# Patient Record
Sex: MALE | Race: WHITE | HISPANIC OR LATINO | URBAN - METROPOLITAN AREA
[De-identification: names, ages, dates, MRNs, and addresses within clinical notes are randomized per-mention and may not be internally consistent; named-entity substitution may affect disease eponyms.]

---

## 2018-02-22 ENCOUNTER — INPATIENT (INPATIENT)
Facility: HOSPITAL | Age: 38
LOS: 1 days | Discharge: HOME | End: 2018-02-24
Attending: SURGERY

## 2018-02-22 VITALS
HEIGHT: 64 IN | TEMPERATURE: 96 F | OXYGEN SATURATION: 98 % | RESPIRATION RATE: 20 BRPM | SYSTOLIC BLOOD PRESSURE: 150 MMHG | DIASTOLIC BLOOD PRESSURE: 96 MMHG | HEART RATE: 71 BPM | WEIGHT: 143.96 LBS

## 2018-02-23 DIAGNOSIS — N28.9 DISORDER OF KIDNEY AND URETER, UNSPECIFIED: ICD-10-CM

## 2018-02-23 DIAGNOSIS — R10.9 UNSPECIFIED ABDOMINAL PAIN: ICD-10-CM

## 2018-02-23 DIAGNOSIS — Z00.00 ENCOUNTER FOR GENERAL ADULT MEDICAL EXAMINATION WITHOUT ABNORMAL FINDINGS: ICD-10-CM

## 2018-02-23 LAB
ALBUMIN SERPL ELPH-MCNC: 4.5 G/DL — SIGNIFICANT CHANGE UP (ref 3–5.5)
ALP SERPL-CCNC: 85 U/L — SIGNIFICANT CHANGE UP (ref 30–115)
ALT FLD-CCNC: 20 U/L — SIGNIFICANT CHANGE UP (ref 0–41)
ANION GAP SERPL CALC-SCNC: 11 MMOL/L — SIGNIFICANT CHANGE UP (ref 7–14)
APTT BLD: 33.5 SEC — SIGNIFICANT CHANGE UP (ref 27–39.2)
AST SERPL-CCNC: 21 U/L — SIGNIFICANT CHANGE UP (ref 0–41)
BASOPHILS # BLD AUTO: 0.03 K/UL — SIGNIFICANT CHANGE UP (ref 0–0.2)
BASOPHILS NFR BLD AUTO: 0.2 % — SIGNIFICANT CHANGE UP (ref 0–1)
BILIRUB SERPL-MCNC: 0.5 MG/DL — SIGNIFICANT CHANGE UP (ref 0.2–1.2)
BLD GP AB SCN SERPL QL: SIGNIFICANT CHANGE UP
BUN SERPL-MCNC: 14 MG/DL — SIGNIFICANT CHANGE UP (ref 10–20)
CALCIUM SERPL-MCNC: 9.3 MG/DL — SIGNIFICANT CHANGE UP (ref 8.5–10.1)
CHLORIDE SERPL-SCNC: 96 MMOL/L — LOW (ref 98–110)
CO2 SERPL-SCNC: 27 MMOL/L — SIGNIFICANT CHANGE UP (ref 17–32)
CREAT SERPL-MCNC: 0.7 MG/DL — SIGNIFICANT CHANGE UP (ref 0.7–1.5)
EOSINOPHIL # BLD AUTO: 0.01 K/UL — SIGNIFICANT CHANGE UP (ref 0–0.7)
EOSINOPHIL NFR BLD AUTO: 0.1 % — SIGNIFICANT CHANGE UP (ref 0–8)
GLUCOSE SERPL-MCNC: 119 MG/DL — HIGH (ref 70–110)
HCT VFR BLD CALC: 44.7 % — SIGNIFICANT CHANGE UP (ref 42–52)
HGB BLD-MCNC: 15.3 G/DL — SIGNIFICANT CHANGE UP (ref 14–18)
IMM GRANULOCYTES NFR BLD AUTO: 1 % — HIGH (ref 0.1–0.3)
INR BLD: 1.07 RATIO — SIGNIFICANT CHANGE UP (ref 0.65–1.3)
LACTATE SERPL-SCNC: 2.1 MMOL/L — SIGNIFICANT CHANGE UP (ref 0.5–2.2)
LIDOCAIN IGE QN: 29 U/L — SIGNIFICANT CHANGE UP (ref 7–60)
LYMPHOCYTES # BLD AUTO: 0.91 K/UL — LOW (ref 1.2–3.4)
LYMPHOCYTES # BLD AUTO: 6.6 % — LOW (ref 20.5–51.1)
MCHC RBC-ENTMCNC: 30.5 PG — SIGNIFICANT CHANGE UP (ref 27–31)
MCHC RBC-ENTMCNC: 34.2 G/DL — SIGNIFICANT CHANGE UP (ref 32–37)
MCV RBC AUTO: 89 FL — SIGNIFICANT CHANGE UP (ref 80–94)
MONOCYTES # BLD AUTO: 0.36 K/UL — SIGNIFICANT CHANGE UP (ref 0.1–0.6)
MONOCYTES NFR BLD AUTO: 2.6 % — SIGNIFICANT CHANGE UP (ref 1.7–9.3)
NEUTROPHILS # BLD AUTO: 12.32 K/UL — HIGH (ref 1.4–6.5)
NEUTROPHILS NFR BLD AUTO: 89.5 % — HIGH (ref 42.2–75.2)
NRBC # BLD: 0 /100 WBCS — SIGNIFICANT CHANGE UP (ref 0–0)
PLATELET # BLD AUTO: 271 K/UL — SIGNIFICANT CHANGE UP (ref 130–400)
POTASSIUM SERPL-MCNC: 3.2 MMOL/L — LOW (ref 3.5–5)
POTASSIUM SERPL-SCNC: 3.2 MMOL/L — LOW (ref 3.5–5)
PROT SERPL-MCNC: 7.7 G/DL — SIGNIFICANT CHANGE UP (ref 6–8)
PROTHROM AB SERPL-ACNC: 11.6 SEC — SIGNIFICANT CHANGE UP (ref 9.95–12.87)
RBC # BLD: 5.02 M/UL — SIGNIFICANT CHANGE UP (ref 4.7–6.1)
RBC # FLD: 12.4 % — SIGNIFICANT CHANGE UP (ref 11.5–14.5)
SODIUM SERPL-SCNC: 134 MMOL/L — LOW (ref 135–146)
TYPE + AB SCN PNL BLD: SIGNIFICANT CHANGE UP
WBC # BLD: 13.77 K/UL — HIGH (ref 4.8–10.8)
WBC # FLD AUTO: 13.77 K/UL — HIGH (ref 4.8–10.8)

## 2018-02-23 RX ORDER — CEFTRIAXONE 500 MG/1
1 INJECTION, POWDER, FOR SOLUTION INTRAMUSCULAR; INTRAVENOUS EVERY 24 HOURS
Qty: 0 | Refills: 0 | Status: CANCELLED | OUTPATIENT
Start: 2018-02-24 | End: 2018-02-23

## 2018-02-23 RX ORDER — OXYCODONE AND ACETAMINOPHEN 5; 325 MG/1; MG/1
2 TABLET ORAL ONCE
Qty: 0 | Refills: 0 | Status: DISCONTINUED | OUTPATIENT
Start: 2018-02-23 | End: 2018-02-24

## 2018-02-23 RX ORDER — FAMOTIDINE 10 MG/ML
20 INJECTION INTRAVENOUS ONCE
Qty: 0 | Refills: 0 | Status: COMPLETED | OUTPATIENT
Start: 2018-02-23 | End: 2018-02-23

## 2018-02-23 RX ORDER — MORPHINE SULFATE 50 MG/1
6 CAPSULE, EXTENDED RELEASE ORAL ONCE
Qty: 0 | Refills: 0 | Status: DISCONTINUED | OUTPATIENT
Start: 2018-02-23 | End: 2018-02-23

## 2018-02-23 RX ORDER — MEPERIDINE HYDROCHLORIDE 50 MG/ML
12.5 INJECTION INTRAMUSCULAR; INTRAVENOUS; SUBCUTANEOUS ONCE
Qty: 0 | Refills: 0 | Status: DISCONTINUED | OUTPATIENT
Start: 2018-02-23 | End: 2018-02-24

## 2018-02-23 RX ORDER — HEPARIN SODIUM 5000 [USP'U]/ML
5000 INJECTION INTRAVENOUS; SUBCUTANEOUS EVERY 8 HOURS
Qty: 0 | Refills: 0 | Status: DISCONTINUED | OUTPATIENT
Start: 2018-02-23 | End: 2018-02-23

## 2018-02-23 RX ORDER — HYDROMORPHONE HYDROCHLORIDE 2 MG/ML
0.5 INJECTION INTRAMUSCULAR; INTRAVENOUS; SUBCUTANEOUS
Qty: 0 | Refills: 0 | Status: DISCONTINUED | OUTPATIENT
Start: 2018-02-23 | End: 2018-02-24

## 2018-02-23 RX ORDER — SODIUM CHLORIDE 9 MG/ML
1000 INJECTION INTRAMUSCULAR; INTRAVENOUS; SUBCUTANEOUS ONCE
Qty: 0 | Refills: 0 | Status: COMPLETED | OUTPATIENT
Start: 2018-02-23 | End: 2018-02-23

## 2018-02-23 RX ORDER — MORPHINE SULFATE 50 MG/1
2 CAPSULE, EXTENDED RELEASE ORAL EVERY 6 HOURS
Qty: 0 | Refills: 0 | Status: DISCONTINUED | OUTPATIENT
Start: 2018-02-23 | End: 2018-02-23

## 2018-02-23 RX ORDER — SODIUM CHLORIDE 9 MG/ML
1000 INJECTION, SOLUTION INTRAVENOUS
Qty: 0 | Refills: 0 | Status: DISCONTINUED | OUTPATIENT
Start: 2018-02-23 | End: 2018-02-23

## 2018-02-23 RX ORDER — MORPHINE SULFATE 50 MG/1
6 CAPSULE, EXTENDED RELEASE ORAL EVERY 4 HOURS
Qty: 0 | Refills: 0 | Status: CANCELLED | OUTPATIENT
Start: 2018-03-03 | End: 2018-02-23

## 2018-02-23 RX ORDER — METOCLOPRAMIDE HCL 10 MG
10 TABLET ORAL ONCE
Qty: 0 | Refills: 0 | Status: DISCONTINUED | OUTPATIENT
Start: 2018-02-23 | End: 2018-02-23

## 2018-02-23 RX ORDER — SODIUM CHLORIDE 9 MG/ML
1000 INJECTION, SOLUTION INTRAVENOUS
Qty: 0 | Refills: 0 | Status: DISCONTINUED | OUTPATIENT
Start: 2018-02-23 | End: 2018-02-24

## 2018-02-23 RX ORDER — MORPHINE SULFATE 50 MG/1
4 CAPSULE, EXTENDED RELEASE ORAL
Qty: 0 | Refills: 0 | Status: DISCONTINUED | OUTPATIENT
Start: 2018-02-23 | End: 2018-02-24

## 2018-02-23 RX ORDER — METRONIDAZOLE 500 MG
TABLET ORAL
Qty: 0 | Refills: 0 | Status: DISCONTINUED | OUTPATIENT
Start: 2018-02-23 | End: 2018-02-23

## 2018-02-23 RX ORDER — CEFTRIAXONE 500 MG/1
INJECTION, POWDER, FOR SOLUTION INTRAMUSCULAR; INTRAVENOUS
Qty: 0 | Refills: 0 | Status: DISCONTINUED | OUTPATIENT
Start: 2018-02-23 | End: 2018-02-23

## 2018-02-23 RX ORDER — METOCLOPRAMIDE HCL 10 MG
10 TABLET ORAL EVERY 6 HOURS
Qty: 0 | Refills: 0 | Status: DISCONTINUED | OUTPATIENT
Start: 2018-02-23 | End: 2018-02-23

## 2018-02-23 RX ORDER — CEFTRIAXONE 500 MG/1
1 INJECTION, POWDER, FOR SOLUTION INTRAMUSCULAR; INTRAVENOUS ONCE
Qty: 0 | Refills: 0 | Status: COMPLETED | OUTPATIENT
Start: 2018-02-23 | End: 2018-02-23

## 2018-02-23 RX ORDER — METRONIDAZOLE 500 MG
500 TABLET ORAL ONCE
Qty: 0 | Refills: 0 | Status: COMPLETED | OUTPATIENT
Start: 2018-02-23 | End: 2018-02-23

## 2018-02-23 RX ORDER — METRONIDAZOLE 500 MG
500 TABLET ORAL EVERY 8 HOURS
Qty: 0 | Refills: 0 | Status: CANCELLED | OUTPATIENT
Start: 2018-02-24 | End: 2018-02-23

## 2018-02-23 RX ADMIN — MORPHINE SULFATE 6 MILLIGRAM(S): 50 CAPSULE, EXTENDED RELEASE ORAL at 01:49

## 2018-02-23 RX ADMIN — MORPHINE SULFATE 6 MILLIGRAM(S): 50 CAPSULE, EXTENDED RELEASE ORAL at 02:13

## 2018-02-23 RX ADMIN — HYDROMORPHONE HYDROCHLORIDE 0.5 MILLIGRAM(S): 2 INJECTION INTRAMUSCULAR; INTRAVENOUS; SUBCUTANEOUS at 23:32

## 2018-02-23 RX ADMIN — SODIUM CHLORIDE 75 MILLILITER(S): 9 INJECTION, SOLUTION INTRAVENOUS at 17:43

## 2018-02-23 RX ADMIN — FAMOTIDINE 20 MILLIGRAM(S): 10 INJECTION INTRAVENOUS at 02:12

## 2018-02-23 RX ADMIN — Medication 100 MILLIGRAM(S): at 19:52

## 2018-02-23 RX ADMIN — HYDROMORPHONE HYDROCHLORIDE 0.5 MILLIGRAM(S): 2 INJECTION INTRAMUSCULAR; INTRAVENOUS; SUBCUTANEOUS at 23:14

## 2018-02-23 RX ADMIN — SODIUM CHLORIDE 1000 MILLILITER(S): 9 INJECTION INTRAMUSCULAR; INTRAVENOUS; SUBCUTANEOUS at 02:14

## 2018-02-23 RX ADMIN — SODIUM CHLORIDE 125 MILLILITER(S): 9 INJECTION, SOLUTION INTRAVENOUS at 23:00

## 2018-02-23 RX ADMIN — Medication 30 MILLILITER(S): at 02:21

## 2018-02-23 NOTE — H&P ADULT - NSHPPHYSICALEXAM_GEN_ALL_CORE
T(C): 36.7 (02-23-18 @ 08:51), Max: 36.7 (02-23-18 @ 08:51)  HR: 78 (02-23-18 @ 08:51) (71 - 78)  BP: 120/70 (02-23-18 @ 08:51) (120/70 - 150/96)  RR: 18 (02-23-18 @ 08:51) (18 - 20)  SpO2: 97% (02-23-18 @ 08:51) (97% - 98%)    PHYSICAL EXAM:    GENERAL: pain improved  HEAD:  Atraumatic, Normocephalic  EYES: EOMI, PERRLA, conjunctiva and sclera clear  NECK: Supple, No JVD  CHEST/LUNG: Clear to auscultation bilaterally; No wheeze  HEART: Regular rate and rhythm; No murmurs, rubs, or gallops  ABDOMEN: Soft, ttp in center, + sheikh,  Bowel sounds present  EXTREMITIES:  2+ Peripheral Pulses, No clubbing, cyanosis, or edema  PSYCH: AAOx3  NEUROLOGY: non-focal  SKIN: No rashes or lesions

## 2018-02-23 NOTE — H&P ADULT - HISTORY OF PRESENT ILLNESS
38 year old male with sudden onset central abdominal pain 1 hour after a meal of laxmi, chicken and rice yesterday at 5pm. Pain was localised with no radiation to the back or shoulder. He denied fever at home but did vomit 5 times. Vomitus was of food with no blood. he opened his bowels regularly prior and denied urinary symptoms. He denied recent weight loss or blood in the urine. He had no previous abdominal surgeries and described only symptoms of occasional reflux.  Otherwise he has no known history of gallstones.     In ED found to have gallbladder sludge, edema and cholelithiasis, he was seen by surgery who recommneded hida scan.    ED meds:  maalox/ famotidien/ ns 1l/ morphine 6*2/ reglan 38 year old male with no PMH came in for sudden onset central abdominal pain 1 hour after a meal of laxmi, chicken and rice yesterday at 5pm. Pain was localised with no radiation to the back or shoulder. He had chills at home AND vomitted 5 times. Vomitus was of food with no blood. No diarrhea or  urinary symptoms. He denied recent weight loss or blood in the urine. He had no previous abdominal surgeries and described only symptoms of occasional reflux.  Otherwise he has no known history of gallstones.   Doesn't have PMD.    In ED found to have gallbladder sludge, edema and cholelithiasis, he was seen by surgery who recommended hida scan.    ED meds:  maalox/ famotidien/ ns 1l/ morphine 6*2/ reglan

## 2018-02-23 NOTE — CHART NOTE - NSCHARTNOTEFT_GEN_A_CORE
Anesthesia Post Op Assessment  		(    ) Intubated           TV _____	Rate _SV____	FiO2__4LNC___  		( X   ) Patent airway. Full return of protective reflexes  		(  X  )Full recovery from anesthesia/sedation to baseline status      Cardiovascular Function:  		BP:	141/91	                  Pulse:		100                  RR:		12                  Temp:		97.6                  O2Sat:100      Mental Status:  	        (  X  ) awake		  (  X  ) alert		 (    ) drowsy	               (    ) sedated      Nausea/Vomiting:  		(    ) Yes, See post-op orders		   (  X ) No      Pain Scale: (0-10):			Treatment:     (  X  ) None	            (    ) See Post-Op/PCA Orders      Post-operative Fluids: 	   (    ) Oral	          ( X   ) See post-op Orders        Comments:    Uneventful. No complications from anesthesia.  Discharge when criteria met.

## 2018-02-23 NOTE — H&P ADULT - PROBLEM SELECTOR PLAN 1
-r/o cholecystitis with thickening and edema of gallbladder wall  -will start ceftriaxone 1g daily  -f/u blood cultures  -f/u with surgery -r/o cholecystitis with thickening and edema of gallbladder wall  -awaiting hida scan result  -will start ceftriaxone 1g daily  -f/u blood cultures  -f/u with surgery

## 2018-02-23 NOTE — ED PROVIDER NOTE - NS ED ROS FT
Constitutional:  no fevers, no chills, no malaise  Eyes:  No visual changes  ENMT: No neck pain or stiffness, no nasal congestion, no ear pain, no throat pain  Cardiac:  No chest pain  Respiratory:  No cough or sob  GI:  As per HPI  :  No dysuria, frequency or burning.  MS:  No back pain, no joint pain.  Neuro:  No headache, no dizziness, no change in mental status  Skin:  No skin rash  Except as documented in the HPI,  all other systems are negative

## 2018-02-23 NOTE — H&P ADULT - NSHPREVIEWOFSYSTEMS_GEN_ALL_CORE
REVIEW OF SYSTEMS:    CONSTITUTIONAL:   RESPIRATORY: No cough, wheezing, hemoptysis; No shortness of breath  CARDIOVASCULAR: No chest pain or palpitations  GASTROINTESTINAL: abdominal pain. vomiting  GENITOURINARY: No dysuria, frequency or hematuria  NEUROLOGICAL: No numbness or weakness REVIEW OF SYSTEMS:    CONSTITUTIONAL: pain  RESPIRATORY: No cough, wheezing, hemoptysis; No shortness of breath  CARDIOVASCULAR: No chest pain or palpitations  GASTROINTESTINAL: abdominal pain. vomiting  GENITOURINARY: No dysuria, frequency or hematuria  NEUROLOGICAL: No numbness or weakness

## 2018-02-23 NOTE — ED PROVIDER NOTE - PHYSICAL EXAMINATION
CONSTITUTIONAL: Well-developed; well-nourished; in no acute distress, nontoxic appearing but ashish uncomfortable due to pain  SKIN: skin exam is warm and dry,  HEAD: Normocephalic; atraumatic.  EYES: PERRL, 3 mm bilateral, no nystagmus, EOM intact; conjunctiva and sclera clear.  ENT: MMM, no nasal congestion  NECK: Supple; non tender.+ full passive ROM in all directions. No JVD  CARD: S1, S2 normal, no murmur  RESP: No wheezes, rales or rhonchi. Good air movement bilaterally  ABD: soft; non-distended; + ttp of the epigastrium w/o guarding or rebound  EXT: Normal ROM. No cyanosis or edema. Dp Pulses intact.   NEURO: awake, alert, following commands, oriented, grossly unremarkable. No Focal deficits. GCS 15.   PSYCH: Cooperative, appropriate.

## 2018-02-23 NOTE — PRE-ANESTHESIA EVALUATION ADULT - NSANTHOSAYNRD_GEN_A_CORE
not done/No. ERENDIRA screening performed.  STOP BANG Legend: 0-2 = LOW Risk; 3-4 = INTERMEDIATE Risk; 5-8 = HIGH Risk

## 2018-02-23 NOTE — ED PROVIDER NOTE - MEDICAL DECISION MAKING DETAILS
Chart finished.     Pt seen by surgery and requesting HIDA scan prior to considering OR intervention.

## 2018-02-23 NOTE — H&P ADULT - ASSESSMENT
38 yr M with no significant pmhx presents with complaints of acute onset epigastric and RUQ abd pain that started after he finished eating dinner. Associated with nausea and 4 episodes  of nbnb vomiting. No diarrhea, fever, chills. No coughing, CP, or SOB. Pt is non smoker, occasionally drinks etoh. 38 yr M with no significant pmhx presents with complaints of acute onset epigastric and RUQ abd pain that started after he finished eating dinner. Associated with nausea and 4 episodes  of nbnb vomiting &chills , w/ ct scan and us abdomen showing cholelithiasis and edema of gallbladder wall suspicious for cholecystitis.    ddx: colecystitis/ pancreatitis/ gastroeneteritis/ gastritis/ hepatitis

## 2018-02-23 NOTE — CHART NOTE - NSCHARTNOTEFT_GEN_A_CORE
Risks, benefits and possible complications including, but not limited to bleeding, infection, need for further OR procedures, need for blood transfusion, PE, DVT stroke, acute cardiac event CBD injury, hernia formation, death, discussed in details with a patient, and patient's family and they understand and agree with procedure which they signed on the consent form and they do not have any further questions.

## 2018-02-23 NOTE — H&P ADULT - NSHPLABSRESULTS_GEN_ALL_CORE
15.3   13.77 )-----------( 271      ( 23 Feb 2018 01:48 )             44.7       02-23    134<L>  |  96<L>  |  14  ----------------------------<  119<H>  3.2<L>   |  27  |  0.7    Ca    9.3      23 Feb 2018 01:48    TPro  7.7  /  Alb  4.5  /  TBili  0.5  /  DBili  x   /  AST  21  /  ALT  20  /  AlkPhos  85  02-23    PT/INR - ( 23 Feb 2018 01:48 )   PT: 11.60 sec;   INR: 1.07 ratio         PTT - ( 23 Feb 2018 01:48 )  PTT:33.5 sec    Lactate Trend  02-23 @ 01:48 Lactate:2.1     -CT Abdomen and Pelvis w/ IV Cont (02.23.18 @ 04:00) >  Dilated gallbladder containing sludge with gallbladder wall edema,   without significant pericholecystic fat inflammation. Further evaluation   with right upper quadrant ultrasound is recommended.  Left renal upper pole 1.9 cm solid lesion with enhancement. Findings are   suspicious for renal cell carcinoma. Further evaluation with MRI is   recommended and urology follow-up is recommended.       US Abdomen Limited (02.23.18 @ 06:59) >    Cholelithiasis with thickened and edematous gallbladder wall. Trace   pericholecystic fluid. Please see concurrent CT scan.

## 2018-02-23 NOTE — H&P ADULT - PROBLEM SELECTOR PLAN 2
left renal 1.9 cm mass: r/o seizure  -needs abdominal MRI left renal 1.9 cm mass: r/o seizure  -needs abdominal MRI after his abdominal pain is taken care of.  -has no contraindication for MRI left renal 1.9 cm mass: r/o malignancy  -needs abdominal MRI after his abdominal pain is taken care of.  -has no contraindication for MRI

## 2018-02-23 NOTE — CONSULT NOTE ADULT - ASSESSMENT
38 year old with >12 hours of central abdominal pain and vomiting. Clinical picture inconsistent with cholecystitis.     Discussed patient with Dr Fisher    Plan:  Keep patient NPO  IVF  Analgesia  HIDA scan, await results before definitive management 38 year old with >12 hours of central abdominal pain and vomiting. Clinical picture inconsistent with cholecystitis.     Discussed patient with Dr Fisher    Plan:  Keep patient NPO  IVF  Analgesia  IV abx  HIDA scan, await results before definitive management  If positive, for lap moshe, possible intraop cholangiogram

## 2018-02-23 NOTE — CONSULT NOTE ADULT - SUBJECTIVE AND OBJECTIVE BOX
VINITA PEREZ [id number]  38y Male    HPI:  38 year old male with sudden onset central abdominal pain 1 hour after a meal of laxmi, chicken and rice yesterday at 5pm. Pain was localised with no radiation to the back or shoulder. He denied fever at home but did vomit 5 times. Vomitus was of food with no blood. he opened his bowels regularly prior and denied urinary symptoms. He denied recent weight loss or blood in the urine. He had no previous abdominal surgeries and described only symptoms of occasional reflux.  Otherwise he has no known history of gallstones.     PAST MEDICAL & SURGICAL HISTORY:  No pertinent past medical history  No significant past surgical history        MEDICATIONS  (STANDING):    MEDICATIONS  (PRN):      Allergies    Allergy Status Unknown    Intolerances        REVIEW OF SYSTEMS    [ ] A ten-point review of systems was otherwise negative except as noted.  [ ] Due to altered mental status/intubation, subjective information were not able to be obtained from the patient. History was obtained, to the extent possible, from review of the chart and collateral sources of information.      Vital Signs Last 24 Hrs  T(C): 35.6 (23 Feb 2018 00:30), Max: 35.6 (22 Feb 2018 23:31)  T(F): 96.1 (23 Feb 2018 00:30), Max: 96.1 (22 Feb 2018 23:31)  HR: 73 (23 Feb 2018 00:30) (71 - 73)  BP: 121/56 (23 Feb 2018 00:30) (121/56 - 150/96)  BP(mean): --  RR: 20 (23 Feb 2018 00:30) (20 - 20)  SpO2: 98% (22 Feb 2018 23:31) (98% - 98%)    PHYSICAL EXAM:  GENERAL: NAD, well-appearing  CHEST/LUNG: Clear to auscultation bilaterally  HEART: Regular rate and rhythm  ABDOMEN: Soft. No RUQ tenderness or epigastric tenderness. Central abdominal tenderness superior to umbilicus. No guarding, rebound or peritonism. Normal bowel sounds.  EXTREMITIES:  No clubbing, cyanosis, or edema      LABS:  Labs:  CAPILLARY BLOOD GLUCOSE                              15.3   13.77 )-----------( 271      ( 23 Feb 2018 01:48 )             44.7       Auto Neutrophil %: 89.5 % (02-23-18 @ 01:48)  Auto Immature Granulocyte %: 1.0 % (02-23-18 @ 01:48)    02-23    134<L>  |  96<L>  |  14  ----------------------------<  119<H>  3.2<L>   |  27  |  0.7      Calcium, Total Serum: 9.3 mg/dL (02-23-18 @ 01:48)      LFTs:             7.7  | 0.5  | 21       ------------------[85      ( 23 Feb 2018 01:48 )  4.5  | x    | 20          Lipase:29     Amylase:x         Lactate, Blood: 2.1 mmol/L (02-23-18 @ 01:48)      Coags:     11.60  ----< 1.07    ( 23 Feb 2018 01:48 )     33.5                    RADIOLOGY & ADDITIONAL STUDIES:  US abdomen: stones, GB wall thickening, no pericholecystic fluid, Downey's negative  CTAP:     Dilated gallbladder containing sludge with gallbladder wall edema, without   significant pericholecystic fat inflammation. Further evaluation with right   upper quadrant ultrasound is recommended     Left renal upper pole 1.9 cm solid lesion with enhancement. Findings are   suspicious for renal cell carcinoma. Further evaluation with MRI is   recommended and urology follow-up is recommended.

## 2018-02-23 NOTE — ED PROVIDER NOTE - PROGRESS NOTE DETAILS
Pt with gallbladder sludge and edema on CT. WIll order RUQ US. Gave signout to Dr. Chaidez. Pt pending surgical consult and RUQ US. Spoke with surgery resident Dr. Bowers

## 2018-02-23 NOTE — BRIEF OPERATIVE NOTE - PROCEDURE
Laparoscopic cholecystectomy  02/23/2018    Active  ILL5 <<-----Click on this checkbox to enter Procedure

## 2018-02-23 NOTE — ED PROVIDER NOTE - OBJECTIVE STATEMENT
38 yr M with no significant pmhx presents with complaints of acute onset epigastric and RUQ abd pain that started after he finished eating dinner. Associated with nausea and 4 episodes  of nbnb vomiting. No diarrhea, fever, chills. No coughing, CP, or SOB. Pt is non smoker, occasionally drinks etoh.

## 2018-02-24 VITALS
TEMPERATURE: 97 F | RESPIRATION RATE: 20 BRPM | SYSTOLIC BLOOD PRESSURE: 110 MMHG | HEART RATE: 73 BPM | DIASTOLIC BLOOD PRESSURE: 57 MMHG

## 2018-02-24 LAB
ALBUMIN SERPL ELPH-MCNC: 3.4 G/DL — SIGNIFICANT CHANGE UP (ref 3–5.5)
ALP SERPL-CCNC: 90 U/L — SIGNIFICANT CHANGE UP (ref 30–115)
ALT FLD-CCNC: 215 U/L — HIGH (ref 0–41)
ANION GAP SERPL CALC-SCNC: 8 MMOL/L — SIGNIFICANT CHANGE UP (ref 7–14)
AST SERPL-CCNC: 144 U/L — HIGH (ref 0–41)
BASOPHILS # BLD AUTO: 0.01 K/UL — SIGNIFICANT CHANGE UP (ref 0–0.2)
BASOPHILS NFR BLD AUTO: 0.1 % — SIGNIFICANT CHANGE UP (ref 0–1)
BILIRUB SERPL-MCNC: 0.8 MG/DL — SIGNIFICANT CHANGE UP (ref 0.2–1.2)
BUN SERPL-MCNC: 13 MG/DL — SIGNIFICANT CHANGE UP (ref 10–20)
CALCIUM SERPL-MCNC: 8.3 MG/DL — LOW (ref 8.5–10.1)
CHLORIDE SERPL-SCNC: 101 MMOL/L — SIGNIFICANT CHANGE UP (ref 98–110)
CO2 SERPL-SCNC: 25 MMOL/L — SIGNIFICANT CHANGE UP (ref 17–32)
CREAT SERPL-MCNC: 0.8 MG/DL — SIGNIFICANT CHANGE UP (ref 0.7–1.5)
EOSINOPHIL # BLD AUTO: 0 K/UL — SIGNIFICANT CHANGE UP (ref 0–0.7)
EOSINOPHIL NFR BLD AUTO: 0 % — SIGNIFICANT CHANGE UP (ref 0–8)
GLUCOSE SERPL-MCNC: 107 MG/DL — SIGNIFICANT CHANGE UP (ref 70–110)
HCT VFR BLD CALC: 40.5 % — LOW (ref 42–52)
HGB BLD-MCNC: 13.7 G/DL — LOW (ref 14–18)
IMM GRANULOCYTES NFR BLD AUTO: 0.4 % — HIGH (ref 0.1–0.3)
LACTATE SERPL-SCNC: 1 MMOL/L — SIGNIFICANT CHANGE UP (ref 0.5–2.2)
LYMPHOCYTES # BLD AUTO: 1.03 K/UL — LOW (ref 1.2–3.4)
LYMPHOCYTES # BLD AUTO: 9.1 % — LOW (ref 20.5–51.1)
MAGNESIUM SERPL-MCNC: 2.4 MG/DL — SIGNIFICANT CHANGE UP (ref 1.8–2.4)
MCHC RBC-ENTMCNC: 30.6 PG — SIGNIFICANT CHANGE UP (ref 27–31)
MCHC RBC-ENTMCNC: 33.8 G/DL — SIGNIFICANT CHANGE UP (ref 32–37)
MCV RBC AUTO: 90.4 FL — SIGNIFICANT CHANGE UP (ref 80–94)
MONOCYTES # BLD AUTO: 0.52 K/UL — SIGNIFICANT CHANGE UP (ref 0.1–0.6)
MONOCYTES NFR BLD AUTO: 4.6 % — SIGNIFICANT CHANGE UP (ref 1.7–9.3)
NEUTROPHILS # BLD AUTO: 9.65 K/UL — HIGH (ref 1.4–6.5)
NEUTROPHILS NFR BLD AUTO: 85.8 % — HIGH (ref 42.2–75.2)
PLATELET # BLD AUTO: 224 K/UL — SIGNIFICANT CHANGE UP (ref 130–400)
POTASSIUM SERPL-MCNC: 4.2 MMOL/L — SIGNIFICANT CHANGE UP (ref 3.5–5)
POTASSIUM SERPL-SCNC: 4.2 MMOL/L — SIGNIFICANT CHANGE UP (ref 3.5–5)
PROT SERPL-MCNC: 5.9 G/DL — LOW (ref 6–8)
RBC # BLD: 4.48 M/UL — LOW (ref 4.7–6.1)
RBC # FLD: 12.7 % — SIGNIFICANT CHANGE UP (ref 11.5–14.5)
SODIUM SERPL-SCNC: 134 MMOL/L — LOW (ref 135–146)
WBC # BLD: 11.26 K/UL — HIGH (ref 4.8–10.8)
WBC # FLD AUTO: 11.26 K/UL — HIGH (ref 4.8–10.8)

## 2018-02-24 RX ORDER — INFLUENZA VIRUS VACCINE 15; 15; 15; 15 UG/.5ML; UG/.5ML; UG/.5ML; UG/.5ML
0.5 SUSPENSION INTRAMUSCULAR ONCE
Qty: 0 | Refills: 0 | Status: COMPLETED | OUTPATIENT
Start: 2018-02-24 | End: 2018-02-24

## 2018-02-24 RX ORDER — ACETAMINOPHEN WITH CODEINE 300MG-30MG
1 TABLET ORAL EVERY 4 HOURS
Qty: 0 | Refills: 0 | Status: DISCONTINUED | OUTPATIENT
Start: 2018-02-24 | End: 2018-02-24

## 2018-02-24 RX ORDER — OXYCODONE AND ACETAMINOPHEN 5; 325 MG/1; MG/1
2 TABLET ORAL EVERY 6 HOURS
Qty: 0 | Refills: 0 | Status: DISCONTINUED | OUTPATIENT
Start: 2018-02-24 | End: 2018-02-24

## 2018-02-24 RX ORDER — OXYCODONE AND ACETAMINOPHEN 5; 325 MG/1; MG/1
1 TABLET ORAL EVERY 4 HOURS
Qty: 0 | Refills: 0 | Status: DISCONTINUED | OUTPATIENT
Start: 2018-02-24 | End: 2018-02-24

## 2018-02-24 RX ORDER — HEPARIN SODIUM 5000 [USP'U]/ML
5000 INJECTION INTRAVENOUS; SUBCUTANEOUS EVERY 8 HOURS
Qty: 0 | Refills: 0 | Status: DISCONTINUED | OUTPATIENT
Start: 2018-02-24 | End: 2018-02-24

## 2018-02-24 RX ORDER — MORPHINE SULFATE 50 MG/1
2 CAPSULE, EXTENDED RELEASE ORAL EVERY 4 HOURS
Qty: 0 | Refills: 0 | Status: DISCONTINUED | OUTPATIENT
Start: 2018-02-24 | End: 2018-02-24

## 2018-02-24 RX ORDER — CEFOTETAN DISODIUM 1 G
1 VIAL (EA) INJECTION EVERY 12 HOURS
Qty: 0 | Refills: 0 | Status: DISCONTINUED | OUTPATIENT
Start: 2018-02-24 | End: 2018-02-24

## 2018-02-24 NOTE — DISCHARGE NOTE ADULT - MEDICATION SUMMARY - MEDICATIONS TO TAKE
I will START or STAY ON the medications listed below when I get home from the hospital:    Percocet 5/325 oral tablet  -- 1 tab(s) by mouth 4 times a day -for severe pain MDD:5 tabs   -- Caution federal law prohibits the transfer of this drug to any person other  than the person for whom it was prescribed.  May cause drowsiness.  Alcohol may intensify this effect.  Use care when operating dangerous machinery.  This prescription cannot be refilled.  This product contains acetaminophen.  Do not use  with any other product containing acetaminophen to prevent possible liver damage.  Using more of this medication than prescribed may cause serious breathing problems.    -- Indication: For postop

## 2018-02-24 NOTE — DISCHARGE NOTE ADULT - CARE PLAN
Principal Discharge DX:	Cholecystitis  Goal:	complete resolution  Assessment and plan of treatment:	s/p lap cholecystectomy  Goal:	complete resolution

## 2018-02-24 NOTE — PROGRESS NOTE ADULT - SUBJECTIVE AND OBJECTIVE BOX
SURGERY PROGRESS NOTE    Hospital Day #1d  Post-Op Day #1    Procedure/Dx: laparoscopic cholecystectomy     Events of past 24 hours:    Pt c/o RUQ abd pain s/p laparoscopic cholecystectomy.       Diet: reg    I&O's Detail    23 Feb 2018 07:01  -  24 Feb 2018 02:12  --------------------------------------------------------  IN:    lactated ringers.: 250 mL  Total IN: 250 mL    OUT:  Total OUT: 0 mL    Total NET: 250 mL      PHYSICAL EXAM    Vital Signs Last 24 Hrs  T(C): 37 (24 Feb 2018 00:00), Max: 37 (24 Feb 2018 00:00)  T(F): 98.6 (24 Feb 2018 00:00), Max: 98.6 (24 Feb 2018 00:00)  HR: 98 (24 Feb 2018 00:30) (78 - 118)  BP: 111/66 (24 Feb 2018 00:30) (111/66 - 150/93)  BP(mean): --  RR: 19 (24 Feb 2018 00:30) (12 - 20)  SpO2: 95% (24 Feb 2018 00:30) (95% - 100%)    Gen:   CV:   Lungs:   Abd:  Incision:   Ext:     MEDICATIONS  (STANDING):    MEDICATIONS  (PRN):        LAB/STUDIES:                        15.3   13.77 )-----------( 271      ( 23 Feb 2018 01:48 )             44.7     02-23    134<L>  |  96<L>  |  14  ----------------------------<  119<H>  3.2<L>   |  27  |  0.7    Ca    9.3      23 Feb 2018 01:48    TPro  7.7  /  Alb  4.5  /  TBili  0.5  /  DBili  x   /  AST  21  /  ALT  20  /  AlkPhos  85  02-23    PT/INR - ( 23 Feb 2018 01:48 )   PT: 11.60 sec;   INR: 1.07 ratio         PTT - ( 23 Feb 2018 01:48 )  PTT:33.5 sec              SPECTRA: 6001

## 2018-02-24 NOTE — DISCHARGE NOTE ADULT - PATIENT PORTAL LINK FT
You can access the [facility], offered by [facility], by registering with the following website: [email or web address]

## 2018-02-24 NOTE — DISCHARGE NOTE ADULT - HOSPITAL COURSE
Patient came to the hospital with abdominal pain, imaging revealed gall bladder wall edema, and patient was taken to the OR for a lap cholecystectomy. Patient was postoperatively shifted to the floow, diet advanced, patient tolerated and is being discharged in stable condition.

## 2018-02-28 LAB — SURGICAL PATHOLOGY STUDY: SIGNIFICANT CHANGE UP

## 2018-03-01 LAB
CULTURE RESULTS: SIGNIFICANT CHANGE UP
SPECIMEN SOURCE: SIGNIFICANT CHANGE UP

## 2018-03-05 DIAGNOSIS — R55 SYNCOPE AND COLLAPSE: ICD-10-CM

## 2018-03-05 DIAGNOSIS — K80.12 CALCULUS OF GALLBLADDER WITH ACUTE AND CHRONIC CHOLECYSTITIS WITHOUT OBSTRUCTION: ICD-10-CM

## 2018-03-07 ENCOUNTER — EMERGENCY (EMERGENCY)
Facility: HOSPITAL | Age: 38
LOS: 0 days | Discharge: HOME | End: 2018-03-07
Attending: EMERGENCY MEDICINE

## 2018-03-07 VITALS
HEART RATE: 89 BPM | OXYGEN SATURATION: 100 % | TEMPERATURE: 97 F | DIASTOLIC BLOOD PRESSURE: 57 MMHG | SYSTOLIC BLOOD PRESSURE: 114 MMHG | RESPIRATION RATE: 18 BRPM

## 2018-03-07 DIAGNOSIS — Z79.891 LONG TERM (CURRENT) USE OF OPIATE ANALGESIC: ICD-10-CM

## 2018-03-07 DIAGNOSIS — Z90.49 ACQUIRED ABSENCE OF OTHER SPECIFIED PARTS OF DIGESTIVE TRACT: ICD-10-CM

## 2018-03-07 DIAGNOSIS — Z48.01 ENCOUNTER FOR CHANGE OR REMOVAL OF SURGICAL WOUND DRESSING: ICD-10-CM

## 2018-03-07 NOTE — ED PROVIDER NOTE - OBJECTIVE STATEMENT
38 y.o. male presented to the ER for post-op check.  Pt states he had laparoscopic cholecystectomy done on 2/23/18.  Pt missed his appointment because he couldn't pay for the visit.  Pt does have a follow up in the sx clinic scheduled for 3/14/18.  Pt without complaints.  Denies fever, chills, abdominal pain, wound erythema or discharge.

## 2018-03-07 NOTE — ED PROVIDER NOTE - MEDICAL DECISION MAKING DETAILS
wound care discussed; sx follow up in clinic March 14th as scheduled; any new or worsening symptoms; pt instructed to return to ER wound care discussed; sx follow up in clinic March 14th as scheduled; any new or worsening symptoms; pt instructed to return to ER  Note complete

## 2018-03-07 NOTE — ED PROVIDER NOTE - PROGRESS NOTE DETAILS
spoke to sx; will consult on case spoke to sx Resident Dr. Richmond; case discussed with Sx attending; no staple removal today; wound care as directed and clinic follow up March 14th as scheduled

## 2018-03-07 NOTE — ED PROVIDER NOTE - ATTENDING CONTRIBUTION TO CARE
Pt recently had gallbladder surgery. Did not keep appointment for follow up with surgeon. Presents to the ER for evaluation. No complaints. Wound appears well. Seen by surgery in the ED. No issues.

## 2019-10-02 NOTE — PATIENT PROFILE ADULT. - HOW PATIENT ADDRESSED, PROFILE
· Patient reports anxiety, but does not take Adderall for ADHD control at this time    · Continue to monitor Jake

## 2020-12-01 NOTE — ED PROVIDER NOTE - INPATIENT RESIDENT/ACP NOTIFIED DATE
23-Feb-2018 08:52
< from: 12 Lead ECG (11.30.20 @ 16:16) >    Ventricular Rate 74 BPM    Atrial Rate 74 BPM    P-R Interval 126 ms    QRS Duration 72 ms    Q-T Interval 382 ms    QTC Calculation(Bazett) 424 ms      < end of copied text >